# Patient Record
Sex: FEMALE | Race: WHITE | NOT HISPANIC OR LATINO | Employment: OTHER | URBAN - NONMETROPOLITAN AREA
[De-identification: names, ages, dates, MRNs, and addresses within clinical notes are randomized per-mention and may not be internally consistent; named-entity substitution may affect disease eponyms.]

---

## 2017-01-11 ENCOUNTER — CONVERSION ENCOUNTER (OUTPATIENT)
Dept: CARDIOLOGY | Facility: CLINIC | Age: 82
End: 2017-01-11

## 2017-01-11 LAB
INR PPP: 1.1 (ref 0.8–1.2)
PROTHROMBIN TIME: 10.9 SEC (ref 9.1–12)

## 2017-01-18 ENCOUNTER — CONVERSION ENCOUNTER (OUTPATIENT)
Dept: CARDIOLOGY | Facility: CLINIC | Age: 82
End: 2017-01-18

## 2017-01-18 LAB
INR PPP: 1.1 (ref 0.8–1.2)
PROTHROMBIN TIME: 11 SEC (ref 9.1–12)

## 2017-01-26 ENCOUNTER — TELEPHONE (OUTPATIENT)
Dept: CARDIOLOGY | Facility: CLINIC | Age: 82
End: 2017-01-26

## 2017-01-26 NOTE — TELEPHONE ENCOUNTER
Spoke with pt's sister Jossie (emg contact) and she is not sure which blood thinner she is taking.  She asked me to wait until around 2pm today, then retry Angelina's residence, as she is usually not home on Thurs at this time.

## 2017-01-27 NOTE — TELEPHONE ENCOUNTER
Due to being in clinic, I was not able to call pt until this afternoon.  Per pt, she states she is taking Eliquis bid and she d/c'd Coumadin.  I advised her that per Fide, she does not have to get INR checks anymore (as she is on Eliquis).  Pt verbalized understanding.   She denies bleeding issues and states she feels good.

## 2017-01-30 ENCOUNTER — CONVERSION ENCOUNTER (OUTPATIENT)
Dept: CARDIOLOGY | Facility: CLINIC | Age: 82
End: 2017-01-30

## 2017-01-30 ENCOUNTER — TELEPHONE (OUTPATIENT)
Dept: CARDIOLOGY | Facility: CLINIC | Age: 82
End: 2017-01-30

## 2017-01-30 LAB
INR PPP: 1.1 (ref 0.8–1.2)
PROTHROMBIN TIME: 10.7 SEC (ref 9.1–12)

## 2017-01-30 NOTE — TELEPHONE ENCOUNTER
Called and spoke to pt re: her Coumadin,  She stated she is not taking Coumadin any longer.  I advised her she didn't have to do her INRs' any longer.  She is taking the Eliquis.  She and her  voiced understanding.

## 2017-03-31 ENCOUNTER — CONVERSION ENCOUNTER (OUTPATIENT)
Dept: CARDIOLOGY | Facility: CLINIC | Age: 82
End: 2017-03-31

## 2017-03-31 LAB
INR PPP: 1.2 (ref 0.8–1.2)
PROTHROMBIN TIME: 11.6 SEC (ref 9.1–12)

## 2017-04-03 ENCOUNTER — OFFICE VISIT (OUTPATIENT)
Dept: CARDIOLOGY | Facility: CLINIC | Age: 82
End: 2017-04-03

## 2017-04-03 VITALS
WEIGHT: 112 LBS | DIASTOLIC BLOOD PRESSURE: 73 MMHG | BODY MASS INDEX: 19.84 KG/M2 | SYSTOLIC BLOOD PRESSURE: 158 MMHG | HEART RATE: 75 BPM | HEIGHT: 63 IN

## 2017-04-03 DIAGNOSIS — Z79.899 DRUG THERAPY: ICD-10-CM

## 2017-04-03 DIAGNOSIS — I48.20 CHRONIC ATRIAL FIBRILLATION (HCC): Primary | ICD-10-CM

## 2017-04-03 PROCEDURE — 93000 ELECTROCARDIOGRAM COMPLETE: CPT | Performed by: INTERNAL MEDICINE

## 2017-04-03 PROCEDURE — 99213 OFFICE O/P EST LOW 20 MIN: CPT | Performed by: INTERNAL MEDICINE

## 2017-04-03 RX ORDER — NADOLOL 80 MG/1
80 TABLET ORAL DAILY
Qty: 30 TABLET | Refills: 7 | Status: SHIPPED | OUTPATIENT
Start: 2017-04-03 | End: 2017-04-27 | Stop reason: SDUPTHER

## 2017-04-03 NOTE — PROGRESS NOTES
Angelina Hayes  3/30/1933      Patient Active Problem List   Diagnosis   • Chronic anticoagulation         Dear Ho:    Subjective     Angelina Hayes is a 84 y.o. female with the problems as listed above, presents for follow up of atrial fibrillation.      History of Present Illness: Patient denies chest pain, palpitations, dizziness or syncope.  She is accompanied in office with her , who states that they both still take daily walks together.  She reports no bleeding issues with Eliquis.    Today, patient denies chest pain, palpitations, dizziness or syncope.      No Known Allergies:      Current Outpatient Prescriptions:   •  allopurinol (ZYLOPRIM) 100 MG tablet, Take 100 mg by mouth Daily., Disp: , Rfl:   •  ALPRAZolam (XANAX) 0.25 MG tablet, Take 0.25 mg by mouth 2 (Two) Times a Day As Needed for anxiety., Disp: , Rfl:   •  apixaban (ELIQUIS) 2.5 MG tablet tablet, Take 1 tablet by mouth 2 (Two) Times a Day., Disp: 60 tablet, Rfl: 7  •  Cholecalciferol (VITAMIN D3) 30526 UNITS tablet, Take  by mouth., Disp: , Rfl:   •  glipiZIDE (GLUCOTROL) 2.5 MG 24 hr tablet, Take 2.5 mg by mouth Daily., Disp: , Rfl:   •  lisinopril (PRINIVIL,ZESTRIL) 30 MG tablet, Take 30 mg by mouth Daily., Disp: , Rfl:   •  nadolol (CORGARD) 80 MG tablet, Take 1 tablet by mouth Daily. 2 tabs of the morning., Disp: 30 tablet, Rfl: 7  •  simvastatin (ZOCOR) 40 MG tablet, Take 40 mg by mouth Every Night., Disp: , Rfl:   •  spironolactone (ALDACTONE) 25 MG tablet, Take 25 mg by mouth Daily., Disp: , Rfl:       The following portions of the patient's history were reviewed and updated as appropriate: allergies, current medications, past family history, past medical history, past social history, past surgical history and problem list.    Social History   Substance Use Topics   • Smoking status: Never Smoker   • Smokeless tobacco: Never Used   • Alcohol use No       Review of Systems   Constitution: Negative for chills and fever.   HENT:  "Negative for headaches, nosebleeds and sore throat.    Cardiovascular: Positive for palpitations (Exertion). Negative for chest pain, leg swelling and syncope.   Respiratory: Negative for cough, hemoptysis, shortness of breath and wheezing.    Gastrointestinal: Negative for abdominal pain, hematemesis, hematochezia, melena, nausea and vomiting.   Genitourinary: Negative for dysuria and hematuria.   Neurological: Negative for dizziness.       Objective   Vitals:    04/03/17 1336   BP: 158/73   BP Location: Left arm   Patient Position: Sitting   Cuff Size: Adult   Pulse: 75   Weight: 112 lb (50.8 kg)   Height: 63\" (160 cm)     Body mass index is 19.84 kg/(m^2).        Physical Exam   Constitutional: She is oriented to person, place, and time. She appears well-developed and well-nourished.   HENT:   Head: Normocephalic.   Eyes: Conjunctivae and EOM are normal.   Neck: Normal range of motion. Neck supple. No JVD present. No tracheal deviation present. No thyromegaly present.   Cardiovascular: Normal rate.  An irregular rhythm present. Exam reveals no gallop and no friction rub.    No murmur heard.  Pulmonary/Chest: Breath sounds normal. No respiratory distress. She has no wheezes. She has no rales.   Abdominal: Soft. Bowel sounds are normal. She exhibits no mass. There is no tenderness.   Musculoskeletal: She exhibits no edema.   Neurological: She is alert and oriented to person, place, and time. No cranial nerve deficit.   Skin: Skin is warm and dry.   Psychiatric: She has a normal mood and affect.       Lab Results   Component Value Date     02/11/2015    K 4.6 02/11/2015     02/11/2015    CO2 31.3 02/11/2015    BUN 17 02/11/2015    CREATININE 0.87 02/11/2015    GLUCOSE 165 (H) 02/11/2015    CALCIUM 10.4 (H) 02/11/2015    AST 34 (H) 02/11/2015    ALT 20 02/11/2015    ALKPHOS 98 02/11/2015    LABIL2 1.5 02/11/2015     No results found for: CKTOTAL  Lab Results   Component Value Date    WBC 6.9 02/11/2015    " HGB 12.8 02/11/2015    HCT 41.1 02/11/2015     02/11/2015     Lab Results   Component Value Date    INR 1.1 01/30/2017    INR 1.1 01/18/2017    INR 1.1 01/11/2017         ECG 12 Lead  Date/Time: 4/3/2017 1:34 PM  Performed by: RAMIREZ DILLON  Authorized by: RAMIREZ DILLON               Assessment/Plan       Diagnosis Plan   1. Chronic atrial fibrillation with controlled rate     2. Chronic oral anticoagulation with Eliquis with no bleeding issues.             Recommendations:     1.  Check BMP and CBC.  2.  Continue current medications.    Return in about 7 months (around 11/3/2017).    As always, I appreciate very much the opportunity to participate in the cardiovascular care of your patients.      With Best Regards,    Ramirez Dillon MD, [facility]     Scribed for Ramirez Dillon MD by Kaia Romo CMA 4/3/2017  2:07 PM    I, Ramirez Dillon MD, personally performed the services described in this documentation as scribed by the above named individual in my presence, and it is both accurate and complete.  4/3/2017  2:07 PM

## 2017-04-14 ENCOUNTER — TELEPHONE (OUTPATIENT)
Dept: CARDIOLOGY | Facility: CLINIC | Age: 82
End: 2017-04-14

## 2017-04-26 ENCOUNTER — TELEPHONE (OUTPATIENT)
Dept: CARDIOLOGY | Facility: CLINIC | Age: 82
End: 2017-04-26

## 2017-04-26 NOTE — TELEPHONE ENCOUNTER
Called pt to remind her of overdue BMP and CBC.  She stated she will go this week or next to [facility].

## 2017-04-27 ENCOUNTER — TELEPHONE (OUTPATIENT)
Dept: CARDIOLOGY | Facility: CLINIC | Age: 82
End: 2017-04-27

## 2017-04-27 RX ORDER — NADOLOL 80 MG/1
TABLET ORAL
Qty: 60 TABLET | Refills: 7 | Status: SHIPPED | OUTPATIENT
Start: 2017-04-27 | End: 2017-11-20 | Stop reason: ALTCHOICE

## 2017-04-27 NOTE — TELEPHONE ENCOUNTER
Patient called requesting refill on her medicine. Also appt info.  And forgot about her labs.  She requested to go to an out [facility] lab therefore I mailed her lab orders to her address for her to get these performed.

## 2017-05-02 LAB
AMBIG ABBREV BMP8 DEFAULT: NORMAL
BASOPHILS # BLD AUTO: 0 X10E3/UL (ref 0–0.2)
BASOPHILS NFR BLD AUTO: 0 %
BUN SERPL-MCNC: 20 MG/DL (ref 8–27)
BUN/CREAT SERPL: 21 (ref 12–28)
CALCIUM SERPL-MCNC: 10.5 MG/DL (ref 8.7–10.3)
CHLORIDE SERPL-SCNC: 100 MMOL/L (ref 96–106)
CO2 SERPL-SCNC: 30 MMOL/L (ref 18–29)
CREAT SERPL-MCNC: 0.96 MG/DL (ref 0.57–1)
EOSINOPHIL # BLD AUTO: 0.1 X10E3/UL (ref 0–0.4)
EOSINOPHIL NFR BLD AUTO: 1 %
ERYTHROCYTE [DISTWIDTH] IN BLOOD BY AUTOMATED COUNT: 12.5 % (ref 12.3–15.4)
GLUCOSE SERPL-MCNC: 129 MG/DL (ref 65–99)
HCT VFR BLD AUTO: 39.6 % (ref 34–46.6)
HGB BLD-MCNC: 13.4 G/DL (ref 11.1–15.9)
LYMPHOCYTES # BLD AUTO: 1.4 X10E3/UL (ref 0.7–3.1)
LYMPHOCYTES NFR BLD AUTO: 17 %
MCH RBC QN AUTO: 31.2 PG (ref 26.6–33)
MCHC RBC AUTO-ENTMCNC: 33.8 G/DL (ref 31.5–35.7)
MCV RBC AUTO: 92 FL (ref 79–97)
MONOCYTES # BLD AUTO: 0.6 X10E3/UL (ref 0.1–0.9)
MONOCYTES NFR BLD AUTO: 7 %
NEUTROPHILS # BLD AUTO: 5.9 X10E3/UL (ref 1.4–7)
NEUTROPHILS NFR BLD AUTO: 75 %
PLATELET # BLD AUTO: 176 X10E3/UL (ref 150–379)
POTASSIUM SERPL-SCNC: 5 MMOL/L (ref 3.5–5.2)
RBC # BLD AUTO: 4.3 X10E6/UL (ref 3.77–5.28)
SODIUM SERPL-SCNC: 139 MMOL/L (ref 134–144)
WBC # BLD AUTO: 7.9 X10E3/UL (ref 3.4–10.8)

## 2017-11-20 ENCOUNTER — OFFICE VISIT (OUTPATIENT)
Dept: CARDIOLOGY | Facility: CLINIC | Age: 82
End: 2017-11-20

## 2017-11-20 VITALS
BODY MASS INDEX: 18.07 KG/M2 | SYSTOLIC BLOOD PRESSURE: 130 MMHG | DIASTOLIC BLOOD PRESSURE: 57 MMHG | HEART RATE: 84 BPM | WEIGHT: 102 LBS | OXYGEN SATURATION: 100 % | HEIGHT: 63 IN

## 2017-11-20 DIAGNOSIS — Z79.01 CHRONIC ANTICOAGULATION: ICD-10-CM

## 2017-11-20 DIAGNOSIS — I48.21 PERMANENT ATRIAL FIBRILLATION (HCC): Primary | ICD-10-CM

## 2017-11-20 PROCEDURE — 99212 OFFICE O/P EST SF 10 MIN: CPT | Performed by: INTERNAL MEDICINE

## 2017-11-20 PROCEDURE — 93000 ELECTROCARDIOGRAM COMPLETE: CPT | Performed by: INTERNAL MEDICINE

## 2017-11-20 RX ORDER — ATENOLOL 25 MG/1
25 TABLET ORAL DAILY
COMMUNITY

## 2017-11-20 RX ORDER — DONEPEZIL HYDROCHLORIDE 5 MG/1
5 TABLET, FILM COATED ORAL NIGHTLY
COMMUNITY
End: 2018-06-25

## 2017-11-20 NOTE — PROGRESS NOTES
CRISTIANO Kwan  Angelina Hayes  3/30/1933  11/20/2017    Patient Active Problem List   Diagnosis   • Permanent atrial fibrillation   • Chronic anticoagulation       Dear CRISTIANO Kwan:    Subjective     Angelina Hayes is a 84 y.o. female with the problems as listed above, presents      History of Present Illness: is a very pleasant 84-year-old  female with history of chronic atrial fibrillation, has been on Eliquis for stroke prevention.  She is here for a cardiology follow-up.  She denies any Complaints of palpitations, dizziness or syncope.  She denies any bleeding problems with Eliquis.  She has moved into an assisted living facility and has been being more active walking .  She has lost about 10 pounds since last visit but states that she is feeling okay.  No recent fever or chills, nausea or vomiting or diarrhea.    No Known Allergies:      Current Outpatient Prescriptions:   •  allopurinol (ZYLOPRIM) 100 MG tablet, Take 100 mg by mouth Daily., Disp: , Rfl:   •  ALPRAZolam (XANAX) 0.25 MG tablet, Take 0.25 mg by mouth 2 (Two) Times a Day As Needed for anxiety., Disp: , Rfl:   •  apixaban (ELIQUIS) 2.5 MG tablet tablet, Take 1 tablet by mouth 2 (Two) Times a Day., Disp: 60 tablet, Rfl: 7  •  atenolol (TENORMIN) 25 MG tablet, Take 25 mg by mouth Daily., Disp: , Rfl:   •  Cholecalciferol (VITAMIN D3) 79636 UNITS tablet, Take  by mouth., Disp: , Rfl:   •  Cranberry-Vitamin C-Probiotic (AZO CRANBERRY PO), Take  by mouth., Disp: , Rfl:   •  donepezil (ARICEPT) 5 MG tablet, Take 5 mg by mouth Every Night., Disp: , Rfl:   •  glipiZIDE (GLUCOTROL) 2.5 MG 24 hr tablet, Take 2.5 mg by mouth Daily., Disp: , Rfl:   •  lisinopril (PRINIVIL,ZESTRIL) 30 MG tablet, Take 30 mg by mouth Daily., Disp: , Rfl:   •  simvastatin (ZOCOR) 40 MG tablet, Take 40 mg by mouth Every Night., Disp: , Rfl:   •  spironolactone (ALDACTONE) 25 MG tablet, Take 25 mg by mouth Daily., Disp: , Rfl:       The following portions of  "the patient's history were reviewed and updated as appropriate: allergies, current medications, past family history, past medical history, past social history, past surgical history and problem list.    Social History   Substance Use Topics   • Smoking status: Never Smoker   • Smokeless tobacco: Never Used   • Alcohol use No       Review of Systems   Constitution: Negative for chills and fever.   HENT: Negative for nosebleeds and sore throat.    Cardiovascular: Negative for chest pain, leg swelling, palpitations and syncope.   Respiratory: Negative for cough, hemoptysis, shortness of breath and wheezing.    Gastrointestinal: Negative for abdominal pain, hematemesis, hematochezia, melena, nausea and vomiting.   Genitourinary: Negative for dysuria and hematuria.   Neurological: Negative for dizziness and headaches.       Objective   Vitals:    11/20/17 1357   BP: 130/57   BP Location: Left arm   Patient Position: Sitting   Cuff Size: Adult   Pulse: 84   SpO2: 100%   Weight: 102 lb (46.3 kg)   Height: 63\" (160 cm)     Body mass index is 18.07 kg/(m^2).        Physical Exam   Constitutional: She is oriented to person, place, and time. She appears well-developed and well-nourished.   HENT:   Head: Normocephalic.   Eyes: Conjunctivae and EOM are normal.   Neck: Normal range of motion. Neck supple. No JVD present. No tracheal deviation present. No thyromegaly present.   Cardiovascular: Normal rate, S1 normal and S2 normal.  An irregularly irregular rhythm present. Exam reveals no gallop, no S3, no S4 and no friction rub.    No murmur heard.  Pulmonary/Chest: Breath sounds normal. No respiratory distress. She has no wheezes. She has no rales.   Abdominal: Soft. Bowel sounds are normal. She exhibits no mass. There is no tenderness.   Musculoskeletal: She exhibits no edema.   Neurological: She is alert and oriented to person, place, and time. No cranial nerve deficit.   Skin: Skin is warm and dry.   Psychiatric: She has a " normal mood and affect.       Lab Results   Component Value Date     05/02/2017    K 5.0 05/02/2017     05/02/2017    CO2 30 (H) 05/02/2017    BUN 20 05/02/2017    CREATININE 0.96 05/02/2017    GLUCOSE 165 (H) 02/11/2015    CALCIUM 10.5 (H) 05/02/2017    AST 34 (H) 02/11/2015    ALT 20 02/11/2015    ALKPHOS 98 02/11/2015    LABIL2 1.5 02/11/2015     No results found for: CKTOTAL  Lab Results   Component Value Date    WBC 7.9 05/02/2017    HGB 13.4 05/02/2017    HCT 39.6 05/02/2017     05/02/2017     Lab Results   Component Value Date    INR 1.2 03/31/2017    INR 1.1 01/30/2017    INR 1.1 01/18/2017     Echo   No results found for: ECHOEFEST    ECG 12 Lead  Date/Time: 11/20/2017 1:59 PM  Performed by: RAMIREZ DILLON  Authorized by: RAMIREZ DILLON   Rhythm: atrial fibrillation  Conduction: conduction normal  ST Segments: ST segments normal  T Waves: T waves normal              Assessment/Plan      1. Permanent atrial fibrillationWith controlled ventricular rate.      2. Chronic anticoagulation with Eliquis with no bleeding issues.           Recommendations:  1. Continue with atenolol and Eliquis at the same dose.  2. Follow-up in about 7 months.    Return in about 7 months (around 6/20/2018) for or sooner if needed.    As always, I appreciate very much the opportunity to participate in the cardiovascular care of your patients.      With Best Regards,    Ramirez Dillon MD, [facility]    Dragon disclaimer:  Much of this encounter note is an electronic transcription/translation of spoken language to printed text. The electronic translation of spoken language may permit erroneous, or at times, nonsensical words or phrases to be inadvertently transcribed; Although I have reviewed the note for such errors, some may still exist.

## 2018-06-25 ENCOUNTER — OFFICE VISIT (OUTPATIENT)
Dept: CARDIOLOGY | Facility: CLINIC | Age: 83
End: 2018-06-25

## 2018-06-25 VITALS
WEIGHT: 110 LBS | RESPIRATION RATE: 16 BRPM | DIASTOLIC BLOOD PRESSURE: 64 MMHG | SYSTOLIC BLOOD PRESSURE: 161 MMHG | HEART RATE: 77 BPM | BODY MASS INDEX: 19.49 KG/M2 | HEIGHT: 63 IN

## 2018-06-25 DIAGNOSIS — E11.9 TYPE 2 DIABETES MELLITUS WITHOUT COMPLICATION, WITHOUT LONG-TERM CURRENT USE OF INSULIN (HCC): ICD-10-CM

## 2018-06-25 DIAGNOSIS — I48.21 PERMANENT ATRIAL FIBRILLATION (HCC): ICD-10-CM

## 2018-06-25 DIAGNOSIS — Z79.01 CHRONIC ANTICOAGULATION: Primary | ICD-10-CM

## 2018-06-25 PROCEDURE — 93000 ELECTROCARDIOGRAM COMPLETE: CPT | Performed by: INTERNAL MEDICINE

## 2018-06-25 PROCEDURE — 99213 OFFICE O/P EST LOW 20 MIN: CPT | Performed by: INTERNAL MEDICINE

## 2018-06-25 RX ORDER — DONEPEZIL HYDROCHLORIDE 10 MG/1
10 TABLET, FILM COATED ORAL NIGHTLY
COMMUNITY
Start: 2018-06-25

## 2018-06-25 NOTE — PROGRESS NOTES
Morelia Trujillo  Angelina Hayes  3/30/1933  06/25/2018    Patient Active Problem List   Diagnosis   • Permanent atrial fibrillation   • Chronic anticoagulation   • Type 2 diabetes mellitus without complication, without long-term current use of insulin       Dear Morelia Trujillo:    Subjective     Angelina Hayes is a 85 y.o. female with the problems as listed above, presents    Chief complaint: Follow-up of atrial fibrillation.    History of Present Illness:  is a pleasant 85-year-old  female with history of chronic atrial fibrillation, is here for a cardiology follow-up.  She has been on Eliquis for stroke prevention with apparently no bleeding issues.  She denies any compressive palpitations, dizziness or syncope.  She denies any dyspnea, PND, orthopnea or pedal edema.  Overall she's been doing better with improved appetite.  Her blood pressures in the assisted living facility and at her primary care's office apparently were running normal.    No Known Allergies:      Current Outpatient Prescriptions:   •  allopurinol (ZYLOPRIM) 100 MG tablet, Take 100 mg by mouth Daily., Disp: , Rfl:   •  ALPRAZolam (XANAX) 0.25 MG tablet, Take 0.25 mg by mouth 2 (Two) Times a Day As Needed for anxiety., Disp: , Rfl:   •  apixaban (ELIQUIS) 2.5 MG tablet tablet, Take 1 tablet by mouth 2 (Two) Times a Day., Disp: 60 tablet, Rfl: 7  •  atenolol (TENORMIN) 25 MG tablet, Take 25 mg by mouth Daily., Disp: , Rfl:   •  Cholecalciferol (VITAMIN D3) 82356 UNITS tablet, Take  by mouth., Disp: , Rfl:   •  Cranberry-Vitamin C-Probiotic (AZO CRANBERRY PO), Take  by mouth., Disp: , Rfl:   •  donepezil (ARICEPT) 10 MG tablet, Take 1 tablet by mouth Every Night., Disp: , Rfl:   •  glipiZIDE (GLUCOTROL) 2.5 MG 24 hr tablet, Take 2.5 mg by mouth Daily., Disp: , Rfl:   •  lisinopril (PRINIVIL,ZESTRIL) 30 MG tablet, Take 30 mg by mouth Daily., Disp: , Rfl:   •  simvastatin (ZOCOR) 40 MG tablet, Take 40 mg by mouth Every Night., Disp: , Rfl:   •  " spironolactone (ALDACTONE) 25 MG tablet, Take 25 mg by mouth Daily., Disp: , Rfl:       The following portions of the patient's history were reviewed and updated as appropriate: allergies, current medications, past family history, past medical history, past social history, past surgical history and problem list.    Social History   Substance Use Topics   • Smoking status: Never Smoker   • Smokeless tobacco: Never Used   • Alcohol use No       Review of Systems   Constitution: Negative for chills and fever.   HENT: Negative for nosebleeds and sore throat.    Cardiovascular: Negative for chest pain, leg swelling and palpitations.   Respiratory: Negative for cough, hemoptysis, shortness of breath and wheezing.    Gastrointestinal: Negative for abdominal pain, hematemesis, hematochezia, melena, nausea and vomiting.   Genitourinary: Negative for dysuria and hematuria.   Neurological: Negative for headaches.       Objective   Vitals:    06/25/18 1446   BP: 161/64   Pulse: 77   Resp: 16   Weight: 49.9 kg (110 lb)   Height: 160 cm (63\")     Body mass index is 19.49 kg/m².        Physical Exam   Constitutional: She is oriented to person, place, and time. She appears well-developed and well-nourished.   HENT:   Mouth/Throat: Oropharynx is clear and moist.   Eyes: EOM are normal. Pupils are equal, round, and reactive to light.   Neck: Neck supple. No JVD present. No tracheal deviation present. No thyromegaly present.   Cardiovascular: Normal rate, S1 normal and S2 normal.  An irregularly irregular rhythm present. Exam reveals no gallop, no S3, no S4 and no friction rub.    No murmur heard.  Pulmonary/Chest: Effort normal and breath sounds normal.   Abdominal: Soft. Bowel sounds are normal. She exhibits no mass. There is no tenderness.   Musculoskeletal: Normal range of motion. She exhibits no edema.   Lymphadenopathy:     She has no cervical adenopathy.   Neurological: She is alert and oriented to person, place, and time. "   Skin: Skin is warm and dry. No rash noted.   Psychiatric: She has a normal mood and affect.       Lab Results   Component Value Date     05/02/2017    K 5.0 05/02/2017     05/02/2017    CO2 30 (H) 05/02/2017    BUN 20 05/02/2017    CREATININE 0.96 05/02/2017    GLUCOSE 165 (H) 02/11/2015    CALCIUM 10.5 (H) 05/02/2017    AST 34 (H) 02/11/2015    ALT 20 02/11/2015    ALKPHOS 98 02/11/2015    LABIL2 1.5 02/11/2015     No results found for: CKTOTAL  Lab Results   Component Value Date    WBC 7.9 05/02/2017    HGB 13.4 05/02/2017    HCT 39.6 05/02/2017     05/02/2017       ECG 12 Lead  Date/Time: 6/25/2018 2:47 PM  Performed by: RAMIREZ DILLON  Authorized by: RAMIREZ DILLON   Rhythm: atrial fibrillation  BPM: 69  Conduction: conduction normal  ST Segments: ST segments normal              Assessment/Plan   1. Permanent atrial fibrillation with controlled ventricular rate.      2. Type 2 diabetes mellitus.      3. Dyslipidemia.           Recommendations:  1. Continue with the atenolol and Eliquis at current doses.  2. Follow-up in 6-7 months.    Return in about 7 months (around 1/25/2019).    As always, I appreciate very much the opportunity to participate in the cardiovascular care of your patients.      With Best Regards,    Ramirez Dillon MD, [facility]    Dragon disclaimer:  Much of this encounter note is an electronic transcription/translation of spoken language to printed text. The electronic translation of spoken language may permit erroneous, or at times, nonsensical words or phrases to be inadvertently transcribed; Although I have reviewed the note for such errors, some may still exist.

## 2019-01-24 ENCOUNTER — OFFICE VISIT (OUTPATIENT)
Dept: CARDIOLOGY | Facility: CLINIC | Age: 84
End: 2019-01-24

## 2019-01-24 VITALS
HEIGHT: 63 IN | BODY MASS INDEX: 22.5 KG/M2 | OXYGEN SATURATION: 98 % | SYSTOLIC BLOOD PRESSURE: 137 MMHG | WEIGHT: 127 LBS | DIASTOLIC BLOOD PRESSURE: 63 MMHG | HEART RATE: 76 BPM

## 2019-01-24 DIAGNOSIS — E11.9 TYPE 2 DIABETES MELLITUS WITHOUT COMPLICATION, WITHOUT LONG-TERM CURRENT USE OF INSULIN (HCC): ICD-10-CM

## 2019-01-24 DIAGNOSIS — I48.21 PERMANENT ATRIAL FIBRILLATION (HCC): Primary | ICD-10-CM

## 2019-01-24 DIAGNOSIS — Z79.01 CHRONIC ANTICOAGULATION: ICD-10-CM

## 2019-01-24 PROCEDURE — 93000 ELECTROCARDIOGRAM COMPLETE: CPT | Performed by: INTERNAL MEDICINE

## 2019-01-24 PROCEDURE — 99213 OFFICE O/P EST LOW 20 MIN: CPT | Performed by: INTERNAL MEDICINE

## 2019-01-24 RX ORDER — MIRTAZAPINE 15 MG/1
TABLET, FILM COATED ORAL
COMMUNITY
Start: 2018-12-28

## 2019-01-24 RX ORDER — MEGESTROL ACETATE 20 MG/1
20 TABLET ORAL 2 TIMES DAILY
COMMUNITY

## 2019-01-24 NOTE — PROGRESS NOTES
Morelia Trujillo MD  Angelina Hayes  3/30/1933  01/24/2019    Patient Active Problem List   Diagnosis   • Permanent atrial fibrillation (CMS/HCC)   • Chronic anticoagulation   • Type 2 diabetes mellitus without complication, without long-term current use of insulin (CMS/HCC)       Dear Morelia:    Mohit     Angelina Hayes is a 85 y.o. female with the problems as listed above, presents    Chief complaint: Follow-up of atrial fibrillation.    History of Present Illness: Ms. Hayes is a very pleasant 84-year-old  female with history of chronic atrial fibrillation for which she has been on Eliquis for stroke prevention and atenolol for rate control.  She is here for cardiology follow-up.  She denies any complaints of palpitations, chest pains, shortness of breath.  She denies any bleeding problems from the Eliquis.  Overall she's been doing well.    No Known Allergies:      Current Outpatient Medications:   •  allopurinol (ZYLOPRIM) 100 MG tablet, Take 100 mg by mouth Daily., Disp: , Rfl:   •  ALPRAZolam (XANAX) 0.25 MG tablet, Take 0.25 mg by mouth 2 (Two) Times a Day As Needed for anxiety., Disp: , Rfl:   •  apixaban (ELIQUIS) 2.5 MG tablet tablet, Take 1 tablet by mouth 2 (Two) Times a Day., Disp: 60 tablet, Rfl: 7  •  atenolol (TENORMIN) 25 MG tablet, Take 25 mg by mouth Daily., Disp: , Rfl:   •  Cholecalciferol (VITAMIN D3) 88219 UNITS tablet, Take  by mouth., Disp: , Rfl:   •  Cranberry-Vitamin C-Probiotic (AZO CRANBERRY PO), Take  by mouth., Disp: , Rfl:   •  donepezil (ARICEPT) 10 MG tablet, Take 1 tablet by mouth Every Night., Disp: , Rfl:   •  lisinopril (PRINIVIL,ZESTRIL) 30 MG tablet, Take 30 mg by mouth Daily., Disp: , Rfl:   •  megestrol (MEGACE) 20 MG tablet, Take 20 mg by mouth 2 (Two) Times a Day., Disp: , Rfl:   •  mirtazapine (REMERON) 15 MG tablet, , Disp: , Rfl:   •  simvastatin (ZOCOR) 40 MG tablet, Take 40 mg by mouth Every Night., Disp: , Rfl:   •  spironolactone (ALDACTONE) 25 MG tablet,  "Take 25 mg by mouth Daily., Disp: , Rfl:   •  glipiZIDE (GLUCOTROL) 2.5 MG 24 hr tablet, Take 2.5 mg by mouth Daily., Disp: , Rfl:       The following portions of the patient's history were reviewed and updated as appropriate: allergies, current medications, past family history, past medical history, past social history, past surgical history and problem list.    Social History     Tobacco Use   • Smoking status: Never Smoker   • Smokeless tobacco: Never Used   Substance Use Topics   • Alcohol use: No   • Drug use: No       ROS    Objective   Vitals:    01/24/19 1344   BP: 137/63   BP Location: Right arm   Patient Position: Sitting   Cuff Size: Adult   Pulse: 76   SpO2: 98%   Weight: 57.6 kg (127 lb)   Height: 160 cm (63\")     Body mass index is 22.5 kg/m².        Physical Exam   Constitutional: She is oriented to person, place, and time. She appears well-developed and well-nourished.   HENT:   Mouth/Throat: Oropharynx is clear and moist.   Eyes: EOM are normal. Pupils are equal, round, and reactive to light.   Neck: Neck supple. No JVD present. No tracheal deviation present. No thyromegaly present.   Cardiovascular: Normal rate, S1 normal and S2 normal. An irregularly irregular rhythm present. Exam reveals no gallop and no friction rub.   No murmur heard.  Pulmonary/Chest: Effort normal and breath sounds normal.   Abdominal: Soft. Bowel sounds are normal. She exhibits no mass. There is no tenderness.   Musculoskeletal: Normal range of motion. She exhibits no edema.   Lymphadenopathy:     She has no cervical adenopathy.   Neurological: She is alert and oriented to person, place, and time.   Skin: Skin is warm and dry. No rash noted.   Psychiatric: She has a normal mood and affect.       Lab Results   Component Value Date     05/02/2017    K 5.0 05/02/2017     05/02/2017    CO2 30 (H) 05/02/2017    BUN 20 05/02/2017    CREATININE 0.96 05/02/2017    GLUCOSE 165 (H) 02/11/2015    CALCIUM 10.5 (H) 05/02/2017 "    AST 34 (H) 02/11/2015    ALT 20 02/11/2015    ALKPHOS 98 02/11/2015    LABIL2 1.5 02/11/2015     No results found for: CKTOTAL  Lab Results   Component Value Date    WBC 7.9 05/02/2017    HGB 13.4 05/02/2017    HCT 39.6 05/02/2017     05/02/2017     Lab Results   Component Value Date    INR 1.2 03/31/2017    INR 1.1 01/30/2017    INR 1.1 01/18/2017         ECG 12 Lead  Date/Time: 1/24/2019 1:45 PM  Performed by: Kaia Romo CMA  Authorized by: Ramirez Vázquez MD                 Assessment/Plan :   Diagnosis Plan   1. Permanent atrial fibrillation, with controlled ventricular rate is asymptomatic.      2. Type 2 diabetes mellitus without complication, without long-term current use of insulin.    3. Chronic anticoagulation with Eliquis.           Recommendations:  1. Continue with atenolol and Eliquis current doses.  2. Follow-up in one year.ations:    Return in about 1 year (around 1/24/2020).    As always, Morelia  I appreciate very much the opportunity to participate in the cardiovascular care of your patients. Please do not hesitate to call me with any questions with regards to Angelina Hayes 's evaluation and management.     With Best Regards,        Ramirez Vázquez MD, [facility] disclaimer:  Much of this encounter note is an electronic transcription/translation of spoken language to printed text. The electronic translation of spoken language may permit erroneous, or at times, nonsensical words or phrases to be inadvertently transcribed; Although I have reviewed the note for such errors, some may still exist.

## 2021-05-21 ENCOUNTER — OFFICE VISIT (OUTPATIENT)
Dept: CARDIOLOGY | Facility: CLINIC | Age: 86
End: 2021-05-21

## 2021-05-21 VITALS
OXYGEN SATURATION: 94 % | HEIGHT: 64 IN | BODY MASS INDEX: 21.34 KG/M2 | WEIGHT: 125 LBS | SYSTOLIC BLOOD PRESSURE: 132 MMHG | DIASTOLIC BLOOD PRESSURE: 64 MMHG | HEART RATE: 65 BPM

## 2021-05-21 DIAGNOSIS — Z79.01 CHRONIC ANTICOAGULATION: ICD-10-CM

## 2021-05-21 DIAGNOSIS — E11.9 TYPE 2 DIABETES MELLITUS WITHOUT COMPLICATION, WITHOUT LONG-TERM CURRENT USE OF INSULIN (HCC): ICD-10-CM

## 2021-05-21 DIAGNOSIS — I48.21 PERMANENT ATRIAL FIBRILLATION (HCC): Primary | ICD-10-CM

## 2021-05-21 PROCEDURE — 99213 OFFICE O/P EST LOW 20 MIN: CPT | Performed by: PHYSICIAN ASSISTANT

## 2021-05-21 RX ORDER — ASPIRIN 81 MG/1
81 TABLET, CHEWABLE ORAL DAILY
COMMUNITY

## 2021-05-21 RX ORDER — AMLODIPINE BESYLATE 5 MG/1
5 TABLET ORAL DAILY
COMMUNITY

## 2021-05-21 NOTE — PROGRESS NOTES
Harsh Olivares DO  Angelina Hayes  3/30/1933  05/21/2021    Patient Active Problem List   Diagnosis   • Permanent atrial fibrillation (CMS/HCC)   • Chronic anticoagulation   • Type 2 diabetes mellitus without complication, without long-term current use of insulin (CMS/HCC)       Dear Harsh Olivares DO:    Subjective     History of Present Illness:    Chief Complaint   Patient presents with   • Hospital Follow Up Visit       Angelina Hayes is a pleasant 88 y.o. female with a past medical history significant for chronic atrial fibrillation anticoagulated with Eliquis, diabetes mellitus, dyslipidemia, and essential hypertension.  She comes in today for routine cardiology follow-up.    Mr. Gan was recently hospitalized at [facility] for community-acquired pneumonia she did have a large pleural effusion that was drained via thoracentesis.  She was treated with IV antibiotics and was discharged in stable condition she does live in assisted living and does have some dementia although she was able to answer most questions today she is here with her son.  She does deny any chest pains, shortness of breath worsening from baseline, dizziness, or syncope.  She also denies any bleeding issues with her Eliquis and reports that she has been back to her baseline since being discharged from the hospital.    No Known Allergies:      Current Outpatient Medications:   •  allopurinol (ZYLOPRIM) 100 MG tablet, Take 100 mg by mouth Daily., Disp: , Rfl:   •  amLODIPine (NORVASC) 5 MG tablet, Take 5 mg by mouth Daily., Disp: , Rfl:   •  apixaban (ELIQUIS) 2.5 MG tablet tablet, Take 1 tablet by mouth 2 (Two) Times a Day., Disp: 60 tablet, Rfl: 7  •  aspirin 81 MG chewable tablet, Chew 81 mg Daily., Disp: , Rfl:   •  atenolol (TENORMIN) 25 MG tablet, Take 25 mg by mouth Daily., Disp: , Rfl:   •  calcium carbonate-cholecalciferol 500-400 MG-UNIT tablet tablet, Take  by mouth Daily., Disp: , Rfl:   •  Cholecalciferol  "(VITAMIN D3) 82308 UNITS tablet, Take  by mouth., Disp: , Rfl:   •  Cranberry-Vitamin C-Probiotic (AZO CRANBERRY PO), Take  by mouth., Disp: , Rfl:   •  donepezil (ARICEPT) 10 MG tablet, Take 1 tablet by mouth Every Night., Disp: , Rfl:   •  mirtazapine (REMERON) 15 MG tablet, , Disp: , Rfl:   •  simvastatin (ZOCOR) 40 MG tablet, Take 40 mg by mouth Every Night., Disp: , Rfl:   •  ALPRAZolam (XANAX) 0.25 MG tablet, Take 0.25 mg by mouth 2 (Two) Times a Day As Needed for anxiety., Disp: , Rfl:   •  glipiZIDE (GLUCOTROL) 2.5 MG 24 hr tablet, Take 2.5 mg by mouth Daily., Disp: , Rfl:   •  lisinopril (PRINIVIL,ZESTRIL) 30 MG tablet, Take 30 mg by mouth Daily., Disp: , Rfl:   •  megestrol (MEGACE) 20 MG tablet, Take 20 mg by mouth 2 (Two) Times a Day., Disp: , Rfl:   •  spironolactone (ALDACTONE) 25 MG tablet, Take 25 mg by mouth Daily., Disp: , Rfl:     The following portions of the patient's history were reviewed and updated as appropriate: allergies, current medications, past family history, past medical history, past social history, past surgical history and problem list.    Social History     Tobacco Use   • Smoking status: Never Smoker   • Smokeless tobacco: Never Used   Substance Use Topics   • Alcohol use: No   • Drug use: No         Objective   Vitals:    05/21/21 1116   BP: 132/64   BP Location: Left arm   Patient Position: Sitting   Cuff Size: Adult   Pulse: 65   SpO2: 94%   Weight: 56.7 kg (125 lb)   Height: 162.6 cm (64\")     Body mass index is 21.46 kg/m².    Constitutional:       General: Not in acute distress.     Appearance: Healthy appearance. Well-developed and not in distress. Not diaphoretic.      Comments: Walks with a walker   Eyes:      Conjunctiva/sclera: Conjunctivae normal.      Pupils: Pupils are equal, round, and reactive to light.   HENT:      Head: Normocephalic and atraumatic.   Neck:      Vascular: No carotid bruit or JVD.   Pulmonary:      Effort: Pulmonary effort is normal. No " respiratory distress.      Breath sounds: Normal breath sounds.   Cardiovascular:      Normal rate. Irregularly irregular rhythm.   Skin:     General: Skin is cool.   Neurological:      Mental Status: Alert, oriented to person, place, and time and oriented to person, place and time.         Lab Results   Component Value Date     05/02/2017    K 5.0 05/02/2017     05/02/2017    CO2 30 (H) 05/02/2017    BUN 20 05/02/2017    CREATININE 0.96 05/02/2017    GLUCOSE 165 (H) 02/11/2015    CALCIUM 10.5 (H) 05/02/2017    AST 34 (H) 02/11/2015    ALT 20 02/11/2015    ALKPHOS 98 02/11/2015    LABIL2 1.5 02/11/2015     No results found for: CKTOTAL  Lab Results   Component Value Date    WBC 7.9 05/02/2017    HGB 13.4 05/02/2017    HCT 39.6 05/02/2017     05/02/2017     Lab Results   Component Value Date    INR 1.2 03/31/2017    INR 1.1 01/30/2017    INR 1.1 01/18/2017     No results found for: MG  No results found for: TSH, PSA, CHLPL, TRIG, HDL, LDL   No results found for: BNP    During this visit the following were done:  Labs Reviewed [x]    Labs Ordered []    Radiology Reports Reviewed []    Radiology Ordered []    PCP Records Reviewed []    Referring Provider Records Reviewed []    ER Records Reviewed []    Hospital Records Reviewed []    History Obtained From Family []    Radiology Images Reviewed []    Other Reviewed []    Records Requested []       Procedures    Assessment/Plan    Diagnosis Plan   1. Permanent atrial fibrillation (CMS/HCC)  CBC & Differential   2. Chronic anticoagulation  CBC & Differential   3. Type 2 diabetes mellitus without complication, without long-term current use of insulin (CMS/HCC)              Recommendations:  1. Chronic atrial fibrillation  1. Clinically stable rate controlled today tolerating Eliquis well, I will continue this.  2. Patient was found to be mildly anemic, to be followed by PCP and informed patient sign of this.  She did have iron profile checked while  hospitalized which was unremarkable.  3. I will repeat cbc in a couple weeks.       Return in about 3 months (around 8/21/2021).    As always, I appreciate very much the opportunity to participate in the cardiovascular care of your patients.      With Best Regards,    Marito Mcginnis PA-C

## 2021-05-27 ENCOUNTER — TELEPHONE (OUTPATIENT)
Dept: CARDIOLOGY | Facility: CLINIC | Age: 86
End: 2021-05-27

## 2021-06-07 NOTE — TELEPHONE ENCOUNTER
Called the [phone] number and it is the wrong number.     Called the [phone] number and advised her POA he stated that she will go to [facility] in [place] to have her labs done. I will fax them the order.

## 2021-06-15 LAB
BASOPHILS # BLD AUTO: 0.1 X10E3/UL (ref 0–0.2)
BASOPHILS NFR BLD AUTO: 1 %
EOSINOPHIL # BLD AUTO: 0.1 X10E3/UL (ref 0–0.4)
EOSINOPHIL NFR BLD AUTO: 2 %
ERYTHROCYTE [DISTWIDTH] IN BLOOD BY AUTOMATED COUNT: 13.6 % (ref 11.7–15.4)
HCT VFR BLD AUTO: 32.8 % (ref 34–46.6)
HGB BLD-MCNC: 10.2 G/DL (ref 11.1–15.9)
IMM GRANULOCYTES # BLD AUTO: 0 X10E3/UL (ref 0–0.1)
IMM GRANULOCYTES NFR BLD AUTO: 0 %
LYMPHOCYTES # BLD AUTO: 1.1 X10E3/UL (ref 0.7–3.1)
LYMPHOCYTES NFR BLD AUTO: 14 %
MCH RBC QN AUTO: 28.3 PG (ref 26.6–33)
MCHC RBC AUTO-ENTMCNC: 31.1 G/DL (ref 31.5–35.7)
MCV RBC AUTO: 91 FL (ref 79–97)
MONOCYTES # BLD AUTO: 0.6 X10E3/UL (ref 0.1–0.9)
MONOCYTES NFR BLD AUTO: 8 %
NEUTROPHILS # BLD AUTO: 5.6 X10E3/UL (ref 1.4–7)
NEUTROPHILS NFR BLD AUTO: 75 %
PLATELET # BLD AUTO: 191 X10E3/UL (ref 150–450)
RBC # BLD AUTO: 3.6 X10E6/UL (ref 3.77–5.28)
WBC # BLD AUTO: 7.5 X10E3/UL (ref 3.4–10.8)